# Patient Record
Sex: MALE | HISPANIC OR LATINO | Employment: OTHER | URBAN - METROPOLITAN AREA
[De-identification: names, ages, dates, MRNs, and addresses within clinical notes are randomized per-mention and may not be internally consistent; named-entity substitution may affect disease eponyms.]

---

## 2020-02-10 ENCOUNTER — OFFICE VISIT (OUTPATIENT)
Dept: CARDIOLOGY | Facility: CLINIC | Age: 80
End: 2020-02-10

## 2020-02-10 VITALS
SYSTOLIC BLOOD PRESSURE: 166 MMHG | HEIGHT: 68 IN | HEART RATE: 84 BPM | WEIGHT: 196 LBS | DIASTOLIC BLOOD PRESSURE: 85 MMHG | BODY MASS INDEX: 29.7 KG/M2

## 2020-02-10 DIAGNOSIS — E66.3 OVERWEIGHT: ICD-10-CM

## 2020-02-10 DIAGNOSIS — R07.2 PRECORDIAL PAIN: ICD-10-CM

## 2020-02-10 DIAGNOSIS — R00.2 PALPITATIONS: Primary | ICD-10-CM

## 2020-02-10 DIAGNOSIS — E78.5 HYPERLIPIDEMIA, UNSPECIFIED HYPERLIPIDEMIA TYPE: ICD-10-CM

## 2020-02-10 DIAGNOSIS — I10 ESSENTIAL HYPERTENSION: ICD-10-CM

## 2020-02-10 PROCEDURE — 99203 OFFICE O/P NEW LOW 30 MIN: CPT | Performed by: INTERNAL MEDICINE

## 2020-02-10 PROCEDURE — 93000 ELECTROCARDIOGRAM COMPLETE: CPT | Performed by: INTERNAL MEDICINE

## 2020-02-10 RX ORDER — MELOXICAM 7.5 MG/1
TABLET ORAL
COMMUNITY
Start: 2019-12-30

## 2020-02-10 RX ORDER — SIMVASTATIN 20 MG
TABLET ORAL
COMMUNITY
Start: 2020-01-13

## 2020-02-10 RX ORDER — AMLODIPINE BESYLATE AND BENAZEPRIL HYDROCHLORIDE 10; 40 MG/1; MG/1
CAPSULE ORAL
COMMUNITY
Start: 2019-12-30

## 2020-02-10 RX ORDER — HYDROCHLOROTHIAZIDE 12.5 MG/1
CAPSULE, GELATIN COATED ORAL
COMMUNITY
Start: 2019-12-30 | End: 2020-03-09 | Stop reason: ALTCHOICE

## 2020-02-10 RX ORDER — LEVOTHYROXINE SODIUM 100 UG/1
CAPSULE ORAL
COMMUNITY
Start: 2020-01-16

## 2020-02-10 RX ORDER — PANTOPRAZOLE SODIUM 40 MG/1
TABLET, DELAYED RELEASE ORAL DAILY
COMMUNITY
Start: 2019-12-30

## 2020-02-10 NOTE — PROGRESS NOTES
Subjective:        [facility]  Cardiology Consult Note    Patient Identification:  Name: Toribio Lucas  Age: 79 y.o.  Sex: male  :  1940  MRN: [id number]             CC   for 1 yr getting wk, tired, worse with  Exercise,     palpitation    High bp  But better at home    History of Present Illness:   79-year-old male here for the cardiac evaluation as well as establishment of the care, as the patient over the last 1 year has been getting weak as well as tired, fatigue is mild to moderate in intensity worse with exercise    Patient also has significant palpitations intermittent    Patient blood pressure runs high but usually better at home    Comorbid cardiac risk factors:     Past Medical History:  Past Medical History:   Diagnosis Date   • Diabetes mellitus (CMS/HCC)    • Hyperlipidemia    • Hypertension      Past Surgical History:  Past Surgical History:   Procedure Laterality Date   • CATARACT EXTRACTION  2019      Allergies:  Allergies   Allergen Reactions   • Penicillins Swelling     Home Meds:    (Not in a hospital admission)  Current Meds:   [unfilled]  Social History:   Social History     Tobacco Use   • Smoking status: Former Smoker   • Smokeless tobacco: Never Used   Substance Use Topics   • Alcohol use: Yes      Family History:  Family History   Problem Relation Age of Onset   • Hypertension Mother    • Hypertension Father    • Hyperlipidemia Neg Hx    • Heart failure Neg Hx    • Heart disease Neg Hx    • Heart attack Neg Hx         Review of Systems    Constitutional: No weakness,fatigue, fever, rigors, chills   Eyes: No vision changes, eye pain   ENT/oropharynx: No difficulty swallowing, sore throat, epistaxis, changes in hearing   Cardiovascular:  Palpitation   Respiratory: No shortness of breath, dyspnea on exertion, cough, wheezing hemoptysis   Gastrointestinal: No abdominal pain, nausea, vomiting, diarrhea, bloody stools   Genitourinary: No hematuria, dysuria    Neurological: No headache, tremors, numbness,  one-sided weakness    Musculoskeletal: No cramps, myalgias,  joint pain, joint swelling   Integument: No rash, edema           Constitutional:  Heart Rate:  [84] 84  BP: (166)/(85) 166/85    Physical Exam   General:  Appears in no acute distress  Eyes: PERTL,  HEENT:  No JVD. Thyroid not visibly enlarged. No mucosal pallor or cyanosis  Respiratory: Respirations regular and unlabored at rest. BBS with good air entry in all fields. No crackles, rubs or wheezes auscultated  Cardiovascular: S1S2 Regular rate and rhythm. No murmur, rub or gallop auscultated. No carotid bruits. DP/PT pulses    . No pretibial pitting edema  Gastrointestinal: Abdomen soft, flat, non tender. Bowel sounds present. No hepatosplenomegaly. No ascites  Musculoskeletal: WEST x4. No abnormal movements  Extremities: No digital clubbing or cyanosis  Skin: Color pink. Skin warm and dry to touch. No rashes  No xanthoma  Neuro: AAO x3 CN II-XII grossly intact            ECG 12 Lead  Date/Time: 2/10/2020 10:03 AM  Performed by: Carmen Barnes MD  Authorized by: Carmen Barnes MD   Comparison: not compared with previous ECG   Previous ECG: no previous ECG available  Rhythm: sinus rhythm  ST Flattening: all    Clinical impression: non-specific ECG                Cardiographics  ECG:     Telemetry:    Echocardiogram:     Imaging  Chest X-ray:     Lab Review               @[email or web address]@              Assessment:/ Recommendations / Plan:   Patient Active Problem List   Diagnosis   • Precordial pain   • Palpitations   • Essential hypertension   • Hyperlipidemia   • Overweight                    ICD-10-CM ICD-9-CM   1. Precordial pain R07.2 786.51   2. Palpitations R00.2 785.1   3. Essential hypertension I10 401.9   4. Hyperlipidemia, unspecified hyperlipidemia type E78.5 272.4   5. Overweight E66.3 278.02     1. Precordial pain  Atypical    2. Palpitations  Under control    3. Essential  hypertension  Blood pressure under control    4. Hyperlipidemia, unspecified hyperlipidemia type  Counseling done    5. Overweight  Counseling done  walking lexiscan, echo, holter    See in 1 month    Labs/tests ordered for am      Carmen Barnes MD  2/10/2020, 10:03 AM      EMR Dragon/Transcription:   Dictated utilizing Dragon dictation

## 2020-02-27 ENCOUNTER — HOSPITAL ENCOUNTER (OUTPATIENT)
Dept: CARDIOLOGY | Facility: HOSPITAL | Age: 80
Discharge: HOME | End: 2020-02-27
Admitting: INTERNAL MEDICINE

## 2020-02-27 ENCOUNTER — HOSPITAL ENCOUNTER (OUTPATIENT)
Dept: CARDIOLOGY | Facility: HOSPITAL | Age: 80
Discharge: HOME | End: 2020-02-27

## 2020-02-27 VITALS
HEART RATE: 68 BPM | HEIGHT: 68 IN | SYSTOLIC BLOOD PRESSURE: 138 MMHG | RESPIRATION RATE: 18 BRPM | WEIGHT: 196 LBS | DIASTOLIC BLOOD PRESSURE: 70 MMHG | BODY MASS INDEX: 29.7 KG/M2 | OXYGEN SATURATION: 98 %

## 2020-02-27 LAB
AORTIC DIMENSIONLESS INDEX: 0.8 (DI)
BH CV ECHO MEAS - ACS: 2.1 CM
BH CV ECHO MEAS - AO MAX PG (FULL): 2.2 MMHG
BH CV ECHO MEAS - AO MAX PG: 7.3 MMHG
BH CV ECHO MEAS - AO MEAN PG (FULL): 1 MMHG
BH CV ECHO MEAS - AO MEAN PG: 4 MMHG
BH CV ECHO MEAS - AO ROOT AREA (BSA CORRECTED): 1.7
BH CV ECHO MEAS - AO ROOT AREA: 9.1 CM^2
BH CV ECHO MEAS - AO ROOT DIAM: 3.4 CM
BH CV ECHO MEAS - AO V2 MAX: 135 CM/SEC
BH CV ECHO MEAS - AO V2 MEAN: 88.9 CM/SEC
BH CV ECHO MEAS - AO V2 VTI: 26 CM
BH CV ECHO MEAS - AVA(I,A): 2.9 CM^2
BH CV ECHO MEAS - AVA(I,D): 2.9 CM^2
BH CV ECHO MEAS - AVA(V,A): 2.9 CM^2
BH CV ECHO MEAS - AVA(V,D): 2.9 CM^2
BH CV ECHO MEAS - BSA(HAYCOCK): 2.1 M^2
BH CV ECHO MEAS - BSA: 2 M^2
BH CV ECHO MEAS - BZI_BMI: 29.8 KILOGRAMS/M^2
BH CV ECHO MEAS - BZI_METRIC_HEIGHT: 172.7 CM
BH CV ECHO MEAS - BZI_METRIC_WEIGHT: 88.9 KG
BH CV ECHO MEAS - EDV(CUBED): 42.9 ML
BH CV ECHO MEAS - EDV(MOD-SP2): 68 ML
BH CV ECHO MEAS - EDV(MOD-SP4): 77 ML
BH CV ECHO MEAS - EDV(TEICH): 50.9 ML
BH CV ECHO MEAS - EF(CUBED): 71.6 %
BH CV ECHO MEAS - EF(MOD-BP): 63 %
BH CV ECHO MEAS - EF(MOD-SP2): 66.2 %
BH CV ECHO MEAS - EF(MOD-SP4): 61 %
BH CV ECHO MEAS - EF(TEICH): 64.4 %
BH CV ECHO MEAS - ESV(CUBED): 12.2 ML
BH CV ECHO MEAS - ESV(MOD-SP2): 23 ML
BH CV ECHO MEAS - ESV(MOD-SP4): 30 ML
BH CV ECHO MEAS - ESV(TEICH): 18.1 ML
BH CV ECHO MEAS - FS: 34.3 %
BH CV ECHO MEAS - IVS/LVPW: 1.1
BH CV ECHO MEAS - IVSD: 1.1 CM
BH CV ECHO MEAS - LAT PEAK E' VEL: 9.8 CM/SEC
BH CV ECHO MEAS - LV DIASTOLIC VOL/BSA (35-75): 38 ML/M^2
BH CV ECHO MEAS - LV MASS(C)D: 111 GRAMS
BH CV ECHO MEAS - LV MASS(C)DI: 54.8 GRAMS/M^2
BH CV ECHO MEAS - LV MAX PG: 5.1 MMHG
BH CV ECHO MEAS - LV MEAN PG: 3 MMHG
BH CV ECHO MEAS - LV SYSTOLIC VOL/BSA (12-30): 14.8 ML/M^2
BH CV ECHO MEAS - LV V1 MAX: 113 CM/SEC
BH CV ECHO MEAS - LV V1 MEAN: 75.4 CM/SEC
BH CV ECHO MEAS - LV V1 VTI: 21.9 CM
BH CV ECHO MEAS - LVIDD: 3.5 CM
BH CV ECHO MEAS - LVIDS: 2.3 CM
BH CV ECHO MEAS - LVLD AP2: 7.3 CM
BH CV ECHO MEAS - LVLD AP4: 7.4 CM
BH CV ECHO MEAS - LVLS AP2: 6.1 CM
BH CV ECHO MEAS - LVLS AP4: 6.4 CM
BH CV ECHO MEAS - LVOT AREA (M): 3.5 CM^2
BH CV ECHO MEAS - LVOT AREA: 3.5 CM^2
BH CV ECHO MEAS - LVOT DIAM: 2.1 CM
BH CV ECHO MEAS - LVPWD: 1 CM
BH CV ECHO MEAS - MED PEAK E' VEL: 7.6 CM/SEC
BH CV ECHO MEAS - MV A DUR: 0.12 SEC
BH CV ECHO MEAS - MV A MAX VEL: 73.2 CM/SEC
BH CV ECHO MEAS - MV DEC SLOPE: 309 CM/SEC^2
BH CV ECHO MEAS - MV DEC TIME: 219 SEC
BH CV ECHO MEAS - MV E MAX VEL: 57.7 CM/SEC
BH CV ECHO MEAS - MV E/A: 0.79
BH CV ECHO MEAS - MV MAX PG: 2.6 MMHG
BH CV ECHO MEAS - MV MEAN PG: 1 MMHG
BH CV ECHO MEAS - MV P1/2T MAX VEL: 86.9 CM/SEC
BH CV ECHO MEAS - MV P1/2T: 82.4 MSEC
BH CV ECHO MEAS - MV V2 MAX: 80.3 CM/SEC
BH CV ECHO MEAS - MV V2 MEAN: 42.4 CM/SEC
BH CV ECHO MEAS - MV V2 VTI: 23.3 CM
BH CV ECHO MEAS - MVA P1/2T LCG: 2.5 CM^2
BH CV ECHO MEAS - MVA(P1/2T): 2.7 CM^2
BH CV ECHO MEAS - MVA(VTI): 3.3 CM^2
BH CV ECHO MEAS - PA ACC TIME: 0.06 SEC
BH CV ECHO MEAS - PA MAX PG (FULL): 5.3 MMHG
BH CV ECHO MEAS - PA MAX PG: 7.6 MMHG
BH CV ECHO MEAS - PA PR(ACCEL): 53.8 MMHG
BH CV ECHO MEAS - PA V2 MAX: 138 CM/SEC
BH CV ECHO MEAS - PULM A REVS DUR: 0.11 SEC
BH CV ECHO MEAS - PULM A REVS VEL: 36.1 CM/SEC
BH CV ECHO MEAS - PULM DIAS VEL: 40 CM/SEC
BH CV ECHO MEAS - PULM S/D: 1.4
BH CV ECHO MEAS - PULM SYS VEL: 55.4 CM/SEC
BH CV ECHO MEAS - PVA(V,A): 1.4 CM^2
BH CV ECHO MEAS - PVA(V,D): 1.4 CM^2
BH CV ECHO MEAS - QP/QS: 0.5
BH CV ECHO MEAS - RAP SYSTOLE: 3 MMHG
BH CV ECHO MEAS - RV MAX PG: 2.3 MMHG
BH CV ECHO MEAS - RV MEAN PG: 1 MMHG
BH CV ECHO MEAS - RV V1 MAX: 76.1 CM/SEC
BH CV ECHO MEAS - RV V1 MEAN: 49.9 CM/SEC
BH CV ECHO MEAS - RV V1 VTI: 14.8 CM
BH CV ECHO MEAS - RVOT AREA: 2.5 CM^2
BH CV ECHO MEAS - RVOT DIAM: 1.8 CM
BH CV ECHO MEAS - RVSP: 33 MMHG
BH CV ECHO MEAS - SI(AO): 116.5 ML/M^2
BH CV ECHO MEAS - SI(CUBED): 15.2 ML/M^2
BH CV ECHO MEAS - SI(LVOT): 37.4 ML/M^2
BH CV ECHO MEAS - SI(MOD-SP2): 22.2 ML/M^2
BH CV ECHO MEAS - SI(MOD-SP4): 23.2 ML/M^2
BH CV ECHO MEAS - SI(TEICH): 16.2 ML/M^2
BH CV ECHO MEAS - SV(AO): 236.1 ML
BH CV ECHO MEAS - SV(CUBED): 30.7 ML
BH CV ECHO MEAS - SV(LVOT): 75.9 ML
BH CV ECHO MEAS - SV(MOD-SP2): 45 ML
BH CV ECHO MEAS - SV(MOD-SP4): 47 ML
BH CV ECHO MEAS - SV(RVOT): 37.7 ML
BH CV ECHO MEAS - SV(TEICH): 32.7 ML
BH CV ECHO MEAS - TAPSE (>1.6): 2.8 CM
BH CV ECHO MEAS - TR MAX PG: 30 MMHG
BH CV ECHO MEAS - TR MAX VEL: 276 CM/SEC
BH CV ECHO MEASUREMENTS AVERAGE E/E' RATIO: 6.63
BH CV XLRA - RV BASE: 3.6 CM
BH CV XLRA - TDI S': 13.3 CM/SEC
LEFT ATRIUM VOLUME INDEX: 24.5 ML/M2
MAXIMAL PREDICTED HEART RATE: 143 BPM
STRESS TARGET HR: 122 BPM

## 2020-02-27 PROCEDURE — A9500 TC99M SESTAMIBI: HCPCS | Performed by: INTERNAL MEDICINE

## 2020-02-27 PROCEDURE — 93306 TTE W/DOPPLER COMPLETE: CPT

## 2020-02-27 PROCEDURE — 93306 TTE W/DOPPLER COMPLETE: CPT | Performed by: INTERNAL MEDICINE

## 2020-02-27 PROCEDURE — 93018 CV STRESS TEST I&R ONLY: CPT | Performed by: INTERNAL MEDICINE

## 2020-02-27 PROCEDURE — 25010000002 REGADENOSON 0.4 MG/5ML SOLUTION: Performed by: INTERNAL MEDICINE

## 2020-02-27 PROCEDURE — 93016 CV STRESS TEST SUPVJ ONLY: CPT | Performed by: INTERNAL MEDICINE

## 2020-02-27 PROCEDURE — 0 TECHNETIUM SESTAMIBI: Performed by: INTERNAL MEDICINE

## 2020-02-27 PROCEDURE — 78452 HT MUSCLE IMAGE SPECT MULT: CPT

## 2020-02-27 PROCEDURE — 93017 CV STRESS TEST TRACING ONLY: CPT

## 2020-02-27 PROCEDURE — 78452 HT MUSCLE IMAGE SPECT MULT: CPT | Performed by: INTERNAL MEDICINE

## 2020-02-27 RX ORDER — LANCETS 30 GAUGE
EACH MISCELLANEOUS
COMMUNITY
Start: 2014-05-12

## 2020-02-27 RX ADMIN — REGADENOSON 0.4 MG: 0.08 INJECTION, SOLUTION INTRAVENOUS at 10:51

## 2020-02-27 RX ADMIN — TECHNETIUM TC 99M SESTAMIBI 1 DOSE: 1 INJECTION INTRAVENOUS at 08:25

## 2020-02-27 RX ADMIN — TECHNETIUM TC 99M SESTAMIBI 1 DOSE: 1 INJECTION INTRAVENOUS at 10:50

## 2020-03-02 LAB
BH CV STRESS BP STAGE 1: NORMAL
BH CV STRESS COMMENTS STAGE 1: NORMAL
BH CV STRESS DOSE REGADENOSON STAGE 1: 0.4
BH CV STRESS DURATION MIN STAGE 1: 3
BH CV STRESS DURATION SEC STAGE 1: 44
BH CV STRESS GRADE STAGE 1: 0
BH CV STRESS HR STAGE 1: 99
BH CV STRESS METS STAGE 1: 2.1
BH CV STRESS PROTOCOL 1: NORMAL
BH CV STRESS RECOVERY BP: NORMAL MMHG
BH CV STRESS RECOVERY HR: 88 BPM
BH CV STRESS RECOVERY O2: 98 %
BH CV STRESS SPEED STAGE 1: 1.1
BH CV STRESS STAGE 1: 1
LV EF NUC BP: 60 %
MAXIMAL PREDICTED HEART RATE: 143 BPM
PERCENT MAX PREDICTED HR: 69.23 %
STRESS BASELINE BP: NORMAL MMHG
STRESS BASELINE HR: 77 BPM
STRESS O2 SAT REST: 98 %
STRESS PERCENT HR: 81 %
STRESS POST ESTIMATED WORKLOAD: 2.1 METS
STRESS POST EXERCISE DUR MIN: 3 MIN
STRESS POST EXERCISE DUR SEC: 44 SEC
STRESS POST PEAK BP: NORMAL MMHG
STRESS POST PEAK HR: 99 BPM
STRESS TARGET HR: 122 BPM

## 2020-03-09 ENCOUNTER — OFFICE VISIT (OUTPATIENT)
Dept: CARDIOLOGY | Facility: CLINIC | Age: 80
End: 2020-03-09

## 2020-03-09 VITALS
DIASTOLIC BLOOD PRESSURE: 83 MMHG | SYSTOLIC BLOOD PRESSURE: 154 MMHG | WEIGHT: 192 LBS | HEART RATE: 83 BPM | BODY MASS INDEX: 29.1 KG/M2 | HEIGHT: 68 IN

## 2020-03-09 DIAGNOSIS — E78.5 HYPERLIPIDEMIA, UNSPECIFIED HYPERLIPIDEMIA TYPE: ICD-10-CM

## 2020-03-09 DIAGNOSIS — R00.2 PALPITATIONS: ICD-10-CM

## 2020-03-09 DIAGNOSIS — I10 ESSENTIAL HYPERTENSION: ICD-10-CM

## 2020-03-09 DIAGNOSIS — R07.2 PRECORDIAL PAIN: ICD-10-CM

## 2020-03-09 DIAGNOSIS — I47.1 SVT (SUPRAVENTRICULAR TACHYCARDIA) (HCC): Primary | ICD-10-CM

## 2020-03-09 PROCEDURE — 99214 OFFICE O/P EST MOD 30 MIN: CPT | Performed by: INTERNAL MEDICINE

## 2020-03-09 RX ORDER — BISOPROLOL FUMARATE AND HYDROCHLOROTHIAZIDE 5; 6.25 MG/1; MG/1
1 TABLET ORAL DAILY
Qty: 30 TABLET | Refills: 6 | Status: SHIPPED | OUTPATIENT
Start: 2020-03-09 | End: 2020-08-05

## 2020-03-09 NOTE — PROGRESS NOTES
Subjective:        Toribio Lucas is a 79 y.o. male who here for follow up    CC  palpitation  HPI  79-year-old male recently underwent a stress test and echocardiogram for the evaluation of the palpitation continues to have episodes of palpitations intermittent mild to moderate in intensity with shortness of breath     Problem List Items Addressed This Visit        Cardiovascular and Mediastinum    Palpitations    Essential hypertension    Relevant Medications    bisoprolol-hydrochlorothiazide (ZIAC) 5-6.25 MG per tablet    Hyperlipidemia       Nervous and Auditory    Precordial pain      Other Visit Diagnoses     SVT (supraventricular tachycardia) (CMS/[facility])    -  Primary        .Interpretation Summary        · Findings consistent with an equivocal ECG stress test.  · Left ventricular ejection fraction is normal (Calculated EF = 60%).  · Myocardial perfusion imaging indicates a normal myocardial perfusion study with no evidence of ischemia.  · Impressions are consistent with a low risk study.     Interpretation Summary     · Calculated EF = 63.0%  · There is no evidence of pericardial effusion.     Interpretation Summary     · A normal monitor study.  · NSR WITH OCCASIONAL PACS, PVCS, NSSVTS         The following portions of the patient's history were reviewed and updated as appropriate: allergies, current medications, past family history, past medical history, past social history, past surgical history and problem list.    Past Medical History:   Diagnosis Date   • Diabetes mellitus (CMS/HCC)    • Hyperlipidemia    • Hypertension      reports that he has quit smoking. He has never used smokeless tobacco. He reports that he drinks alcohol. He reports that he does not use drugs.   Family History   Problem Relation Age of Onset   • Hypertension Mother    • Hypertension Father    • Hyperlipidemia Neg Hx    • Heart failure Neg Hx    • Heart attack Neg Hx        Review of Systems  Constitutional: No wt loss,  "fever, fatigue  Gastrointestinal: No nausea, abdominal pain  Behavioral/Psych: No insomnia or anxiety   Cardiovascular palpitation  Objective:       Physical Exam  /83 (BP Location: Left arm, Patient Position: Sitting)   Pulse 83   Ht 172.7 cm (68\")   Wt 87.1 kg (192 lb)   BMI 29.19 kg/m²   General appearance: No acute changes   Neck: Trachea midline; NECK, supple, no thyromegaly or lymphadenopathy   Lungs: Normal size and shape, normal breath sounds, equal distribution of air, no rales and rhonchi   CV: S1-S2 regular, no murmurs, no rub, no gallop   Abdomen: Soft, non-tender; no masses , no abnormal abdominal sounds   Extremities: No deformity , normal color , no peripheral edema   Skin: Normal temperature, turgor and texture; no rash, ulcers          Procedures      Echocardiogram:        Current Outpatient Medications:   •  amLODIPine-benazepril (LOTREL) 10-40 MG per capsule, TK ONE C PO  QD, Disp: , Rfl:   •  hydroCHLOROthiazide (MICROZIDE) 12.5 MG capsule, TK ONE C PO  D, Disp: , Rfl:   •  Lancets misc, Sig: Change Lancet with each BG test Disp: 1 box, Disp: , Rfl:   •  levothyroxine sodium (TIROSINT) 100 MCG capsule, , Disp: , Rfl:   •  meloxicam (MOBIC) 7.5 MG tablet, TK 1 T PO  QD PRN FOR PAIN, Disp: , Rfl:   •  metFORMIN (GLUCOPHAGE) 500 MG tablet, Take  by mouth Daily., Disp: , Rfl:   •  ONE TOUCH ULTRA TEST test strip, USE TO TEST D AND PRN, Disp: , Rfl:   •  pantoprazole (PROTONIX) 40 MG EC tablet, Take  by mouth Daily., Disp: , Rfl:   •  simvastatin (ZOCOR) 20 MG tablet, TK 1 T PO  QHS, Disp: , Rfl:    Assessment:        Patient Active Problem List   Diagnosis   • Precordial pain   • Palpitations   • Essential hypertension   • Hyperlipidemia   • Overweight               Plan:            ICD-10-CM ICD-9-CM   1. SVT (supraventricular tachycardia) (CMS/HCC) I47.1 427.89   2. Palpitations R00.2 785.1   3. Hyperlipidemia, unspecified hyperlipidemia type E78.5 272.4   4. Essential hypertension I10 " 401.9   5. Precordial pain R07.2 786.51     1. SVT (supraventricular tachycardia) (CMS/[facility])  Nonsustained SVT will be treated with beta-blockers    2. Palpitations  Discontinue hydrochlorothiazide and start the beta-blockers    3. Hyperlipidemia, unspecified hyperlipidemia type  Continue current treatment    4. Essential hypertension  Blood pressures under control    5. Precordial pain  Atypical       Dc hctz    Start ziac 5/6.25 mg po daily    Pros and cons of this new medication / change medication has been explained to  the patient    Possible side effects has been explained    Associated need of the blood  Work has been explained    Need for the compliance of the medication has been explained      See in 3 month  COUNSELING:    Toribio Shi was given to patient for the following topics: diagnostic results, risk factor reductions, impressions, risks and benefits of treatment options and importance of treatment compliance .       SMOKING COUNSELING:    [unfilled]    Dictated using Dragon dictation

## 2020-03-13 PROBLEM — I47.1 SVT (SUPRAVENTRICULAR TACHYCARDIA) (HCC): Status: ACTIVE | Noted: 2020-03-13

## 2020-08-05 ENCOUNTER — OFFICE VISIT (OUTPATIENT)
Dept: CARDIOLOGY | Facility: CLINIC | Age: 80
End: 2020-08-05

## 2020-08-05 VITALS
SYSTOLIC BLOOD PRESSURE: 136 MMHG | BODY MASS INDEX: 29.19 KG/M2 | HEIGHT: 68 IN | HEART RATE: 52 BPM | DIASTOLIC BLOOD PRESSURE: 74 MMHG

## 2020-08-05 DIAGNOSIS — I10 ESSENTIAL HYPERTENSION: ICD-10-CM

## 2020-08-05 DIAGNOSIS — R00.2 PALPITATIONS: ICD-10-CM

## 2020-08-05 DIAGNOSIS — E78.5 HYPERLIPIDEMIA, UNSPECIFIED HYPERLIPIDEMIA TYPE: ICD-10-CM

## 2020-08-05 DIAGNOSIS — R06.02 SOB (SHORTNESS OF BREATH): Primary | ICD-10-CM

## 2020-08-05 PROCEDURE — 99213 OFFICE O/P EST LOW 20 MIN: CPT | Performed by: NURSE PRACTITIONER

## 2020-08-05 RX ORDER — BISOPROLOL FUMARATE 5 MG/1
5 TABLET, FILM COATED ORAL DAILY
COMMUNITY
End: 2020-08-05

## 2020-08-05 NOTE — PROGRESS NOTES
Subjective:        Toribio Lucas is a 80 y.o. male who here for follow up    Chief Complaint   Patient presents with   • Follow-up     3 month     Blood pressure recheck    HPI       Toribio Lucas is a 80-year-old male, who is new to this provider.  He has a history of hyperlipidemia, hypertension, diabetes mellitus, and family history of heart disease.  Review his Holter monitor 3/5/2020 revealed a normal monitor study.  With normal sinus rhythm with occasional PACs, PVCs, and NSSVT.  Stress test 3/2/2020 dictated a normal myocardial perfusion study with no evidence of ischemia.  His echo in 2020 revealed no evidence of pericardial effusion.  He does not want an  he is using his family member.        He denies chest pain, syncope and near syncope. +shortness of breath,    The following portions of the patient's history were reviewed and updated as appropriate: allergies, current medications, past family history, past medical history, past social history, past surgical history and problem list.    Past Medical History:   Diagnosis Date   • Diabetes mellitus (CMS/[facility])    • Hyperlipidemia    • Hypertension          reports that he has quit smoking. He has never used smokeless tobacco. He reports that he drinks alcohol. He reports that he does not use drugs.     Family History   Problem Relation Age of Onset   • Hypertension Mother    • Hypertension Father    • Hyperlipidemia Neg Hx    • Heart failure Neg Hx    • Heart attack Neg Hx        ROS     Review of Systems  Constitutional: No wt loss, fever, fatigue  Gastrointestinal: No nausea, abdominal pain  Behavioral/Psych: No insomnia or anxiety  Cardiovascular:+ shortness of breath, denies chest pain, and palpitations.      Objective:           Physical Exam   Constitutional: He is oriented to person, place, and time. He appears well-developed and well-nourished.   HENT:   Head: Normocephalic.   Right Ear: External ear normal.   Left  Ear: External ear normal.   Eyes: EOM are normal.   Neck: Normal range of motion. No JVD present.   Cardiovascular: Normal rate, regular rhythm, normal heart sounds and intact distal pulses. Exam reveals no gallop and no friction rub.   No murmur heard.  Pulmonary/Chest: Effort normal and breath sounds normal. No stridor. No respiratory distress. He has no rales.   Abdominal: Soft. Bowel sounds are normal. He exhibits no distension. There is no tenderness. There is no guarding.   Musculoskeletal: Normal range of motion. He exhibits no edema or tenderness.   Neurological: He is alert and oriented to person, place, and time. He has normal reflexes.   Skin: Skin is warm.   Psychiatric: He has a normal mood and affect. Judgment normal.   Nursing note and vitals reviewed.      Procedures       Interpretation Summary     · A normal monitor study.  · NSR WITH OCCASIONAL PACS, PVCS, NSSVTS       2020  Interpretation Summary        · Findings consistent with an equivocal ECG stress test.  · Left ventricular ejection fraction is normal (Calculated EF = 60%).  · Myocardial perfusion imaging indicates a normal myocardial perfusion study with no evidence of ischemia.  · Impressions are consistent with a low risk study.     Asymptomatic for chest pain. Specificity of study reduced secondary to baseline Non-specific ST-T wave abnormalities,  ECG is equivocal for  suggestion of ischemia  Ectopy: Occasional PVCs with exercise and recovery  Blood pressure response:  Appropriate.  Pharmacologic study due to inability to tolerate increasing speed and grade of treadmill due to inexperience with treadmills and unable to keep up with pace due to stumbling.  Changed to pharmacological for safety reasons.     Supervised by: Dr. Malik          2020  Interpretation Summary     · Calculated EF = 63.0%  · There is no evidence of pericardial effusion.                Current Outpatient Medications:   •  amLODIPine-benazepril (LOTREL) 10-40  MG per capsule, TK ONE C PO  QD, Disp: , Rfl:   •  bisoprolol (ZEBeta) 5 MG tablet, Take 5 mg by mouth Daily., Disp: , Rfl:   •  Lancets misc, Sig: Change Lancet with each BG test Disp: 1 box, Disp: , Rfl:   •  levothyroxine sodium (TIROSINT) 100 MCG capsule, , Disp: , Rfl:   •  meloxicam (MOBIC) 7.5 MG tablet, TK 1 T PO  QD PRN FOR PAIN, Disp: , Rfl:   •  metFORMIN (GLUCOPHAGE) 500 MG tablet, Take  by mouth Daily., Disp: , Rfl:   •  ONE TOUCH ULTRA TEST test strip, USE TO TEST D AND PRN, Disp: , Rfl:   •  pantoprazole (PROTONIX) 40 MG EC tablet, Take  by mouth Daily., Disp: , Rfl:   •  simvastatin (ZOCOR) 20 MG tablet, TK 1 T PO  QHS, Disp: , Rfl:      Assessment:        Patient Active Problem List   Diagnosis   • Precordial pain   • Palpitations   • Essential hypertension   • Hyperlipidemia   • Overweight   • SVT (supraventricular tachycardia) (CMS/[facility])               Plan:   1.  SVT: Controlled.  Continue beta-blocker    2.  Palpitations: Controlled continue beta-blocker    3.  Hyperlipidemia: His PCP manages his cholesterol. Continue statin    4.  Essential hypertension:  controlled on current medications.He states his PCP wanted him to come because when he was in his office his blood pressure was elevated. The blood pressure is controlled today. He will do a blood pressure and HR diary and bring it with him when he returns for his results of his echo.     5. Shortness of breath: will do an echo.     No diagnosis found.    There are no diagnoses linked to this encounter.    COUNSELING: colette Shi was given to patient for the following topics: diagnostic results, risk factor reductions, impressions, risks and benefits of treatment options and importance of treatment compliance .       SMOKING COUNSELING: Denies    He will have an echo and a blood pressure diary.  He will follow-up for his results.    Sincerely,   LARISSA Fletcher  [facility]  08/05/20  09:46      EMR Dragon/Transcription disclaimer:   Much of this encounter note is an electronic transcription/translation of spoken language to printed text. The electronic translation of spoken language may permit erroneous, or at times, nonsensical words or phrases to be inadvertently transcribed; Although I have reviewed the note for such errors, some may still exist.

## 2020-09-15 ENCOUNTER — TELEPHONE (OUTPATIENT)
Dept: CARDIOLOGY | Facility: CLINIC | Age: 80
End: 2020-09-15

## 2020-09-15 NOTE — TELEPHONE ENCOUNTER
SPOKE WITH DAUGHTER AND EXPLAINED THAT WE ARE NOT COVERED BY HIS INSURANCE. WE NEEDED TO CX APPTS. SHE AGREED AND UNDERSTOOD

## 2020-09-21 ENCOUNTER — APPOINTMENT (OUTPATIENT)
Dept: CARDIOLOGY | Facility: HOSPITAL | Age: 80
End: 2020-09-21

## 2020-10-07 RX ORDER — BISOPROLOL FUMARATE AND HYDROCHLOROTHIAZIDE 5; 6.25 MG/1; MG/1
1 TABLET ORAL DAILY
Qty: 30 TABLET | Refills: 6 | Status: SHIPPED | OUTPATIENT
Start: 2020-10-07